# Patient Record
Sex: FEMALE | Race: BLACK OR AFRICAN AMERICAN | NOT HISPANIC OR LATINO | URBAN - METROPOLITAN AREA
[De-identification: names, ages, dates, MRNs, and addresses within clinical notes are randomized per-mention and may not be internally consistent; named-entity substitution may affect disease eponyms.]

---

## 2017-08-13 ENCOUNTER — EMERGENCY (EMERGENCY)
Facility: HOSPITAL | Age: 47
LOS: 1 days | Discharge: HOME | End: 2017-08-13
Attending: EMERGENCY MEDICINE | Admitting: EMERGENCY MEDICINE
Payer: COMMERCIAL

## 2017-08-13 VITALS
SYSTOLIC BLOOD PRESSURE: 154 MMHG | OXYGEN SATURATION: 100 % | TEMPERATURE: 99 F | RESPIRATION RATE: 15 BRPM | HEART RATE: 85 BPM | DIASTOLIC BLOOD PRESSURE: 84 MMHG

## 2017-08-13 PROCEDURE — 93010 ELECTROCARDIOGRAM REPORT: CPT

## 2017-08-13 PROCEDURE — 99284 EMERGENCY DEPT VISIT MOD MDM: CPT | Mod: 25

## 2017-08-13 RX ORDER — IBUPROFEN 200 MG
400 TABLET ORAL ONCE
Qty: 0 | Refills: 0 | Status: COMPLETED | OUTPATIENT
Start: 2017-08-13 | End: 2017-08-13

## 2017-08-13 RX ADMIN — Medication 400 MILLIGRAM(S): at 10:53

## 2017-08-13 NOTE — ED ADULT TRIAGE NOTE - CHIEF COMPLAINT QUOTE
Patient c/o episode of feeling nervousness, uncomfortable feeling to right chest, dizziness and headache around 0930 lasting about 10 minutes after stressful situation at work.  Symptoms now resolved.  PMH- htn

## 2017-08-13 NOTE — ED PROVIDER NOTE - OBJECTIVE STATEMENT
47y F works at [employer] with PMHx of htn presents to the ED with episode of anxiety s/p argument with boss today. Pt states she was working at [employer] this morning and got into an argument with boss who told her she needs to work more. Pt reports feeling anxious with palpitations, whole body shaking, and headache. Pt states symptoms lasted 30 minutes. Pt took deep breaths, calmed down, and symptoms resolved. Pt states has not had symptoms before but feels as though she was anxious due to stress at work. Pt wants note stating work made her anxious. No SOB, no cp. Pt c/o mild 5/10 diffuse headache. No nausea, no vomiting, no visual changes 47y F works at [employer] with PMHx of htn presents to the ED with episode of feeling anxious s/p argument with boss today. Pt states she was working at [employer] this morning and got into an argument with boss who told her she needs to work more. Pt reports feeling anxious with palpitations, whole body shaking, and headache. Pt states symptoms lasted 30 minutes. Pt took deep breaths, calmed down, and symptoms resolved. Pt states has not had symptoms before but feels as though she was anxious due to stress at work. Pt wants note stating work made her anxious. No SOB, no cp. Pt c/o mild 5/10 diffuse headache. No nausea, no vomiting, no visual changes

## 2017-08-13 NOTE — ED PROVIDER NOTE - MEDICAL DECISION MAKING DETAILS
47y F presents with episode of anxiousness, palpitations, and headache. Likely acute stress reaction headache with no red flags. Motrin, offered anxiolytic declined. D/c with pmd follow up.

## 2020-03-25 ENCOUNTER — EMERGENCY (EMERGENCY)
Facility: HOSPITAL | Age: 50
LOS: 1 days | Discharge: HOME | End: 2020-03-25
Attending: EMERGENCY MEDICINE | Admitting: EMERGENCY MEDICINE
Payer: COMMERCIAL

## 2020-03-25 VITALS
SYSTOLIC BLOOD PRESSURE: 153 MMHG | DIASTOLIC BLOOD PRESSURE: 93 MMHG | OXYGEN SATURATION: 100 % | TEMPERATURE: 98 F | RESPIRATION RATE: 18 BRPM | HEART RATE: 79 BPM

## 2020-03-25 PROBLEM — I10 ESSENTIAL (PRIMARY) HYPERTENSION: Chronic | Status: ACTIVE | Noted: 2017-08-13

## 2020-03-25 PROCEDURE — 99283 EMERGENCY DEPT VISIT LOW MDM: CPT

## 2020-03-25 PROCEDURE — 71045 X-RAY EXAM CHEST 1 VIEW: CPT | Mod: 26

## 2020-03-25 NOTE — ED PROVIDER NOTE - NSFOLLOWUPINSTRUCTIONS_ED_ALL_ED_FT
A 14 DAY SELF-QUARANTINE PERIOD WAS RECOMMENDED TO YOU AS PART OF YOUR CARE:  FOR A 14 DAY PERIOD:    Stay inside your home as much as possible, avoiding public places or public interaction.     Do not go to work. If you do enter any public domain, at minimum wear a surgical mask at all times.     Even while indoors, attempt to remain isolated from other individuals such as family or friends, as much as possible.     Return to the emergency room for any symptoms such as worsening shortness of breath, significant worsening cough, high fevers despite antipyretics, or severe weakness/malaise    Take tylenol 1000mg every 6hours for body pain or fevers, fluids, and rest.    For testing, you can call [phone] at [facility], please call first.

## 2020-03-25 NOTE — ED PROVIDER NOTE - PATIENT PORTAL LINK FT
You can access the FollowMyHealth Patient Portal offered by [facility] by registering at the following website: [email or web address]. By joining [facility]’s FollowMyHealth portal, you will also be able to view your health information using other applications (apps) compatible with our system.

## 2020-03-25 NOTE — ED PROVIDER NOTE - OBJECTIVE STATEMENT
50 y/o F w/ PMH HTN presents to the ED c/o cough and back pain for 4 days. Cough associated with clear sputum. Pain worse w/ coughing. No anterior chest pain, shortness of breath or leg swelling. Pt's  also having similar symptoms. 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 No recent travel. Pt also notes lack of smell and taste.

## 2020-03-25 NOTE — ED PROVIDER NOTE - CLINICAL SUMMARY MEDICAL DECISION MAKING FREE TEXT BOX
48 y/o F w/ symptoms of viral syndrome but without fever. Possibly Covid-19 but low risk and well appearing. Does not meet criteria for testing. Will get chest x-ray and send home with isolation precautions.